# Patient Record
Sex: FEMALE | Race: WHITE | Employment: FULL TIME | URBAN - METROPOLITAN AREA
[De-identification: names, ages, dates, MRNs, and addresses within clinical notes are randomized per-mention and may not be internally consistent; named-entity substitution may affect disease eponyms.]

---

## 2020-06-05 PROBLEM — N39.0 UTI (URINARY TRACT INFECTION): Status: ACTIVE | Noted: 2020-06-05

## 2020-06-06 ENCOUNTER — APPOINTMENT (OUTPATIENT)
Dept: GENERAL RADIOLOGY | Age: 40
End: 2020-06-06
Attending: UROLOGY
Payer: COMMERCIAL

## 2020-06-06 ENCOUNTER — HOSPITAL ENCOUNTER (OUTPATIENT)
Age: 40
Setting detail: OBSERVATION
Discharge: HOME | End: 2020-06-06
Attending: UROLOGY | Admitting: UROLOGY
Payer: COMMERCIAL

## 2020-06-06 ENCOUNTER — ANESTHESIA EVENT (OUTPATIENT)
Dept: OPERATING ROOM | Age: 40
End: 2020-06-06
Payer: COMMERCIAL

## 2020-06-06 ENCOUNTER — ANESTHESIA (OUTPATIENT)
Dept: OPERATING ROOM | Age: 40
End: 2020-06-06
Payer: COMMERCIAL

## 2020-06-06 VITALS
SYSTOLIC BLOOD PRESSURE: 104 MMHG | HEIGHT: 66 IN | BODY MASS INDEX: 23.3 KG/M2 | WEIGHT: 145 LBS | OXYGEN SATURATION: 100 % | DIASTOLIC BLOOD PRESSURE: 65 MMHG | TEMPERATURE: 97.9 F | RESPIRATION RATE: 16 BRPM | HEART RATE: 71 BPM

## 2020-06-06 VITALS
DIASTOLIC BLOOD PRESSURE: 58 MMHG | SYSTOLIC BLOOD PRESSURE: 93 MMHG | OXYGEN SATURATION: 100 % | RESPIRATION RATE: 13 BRPM

## 2020-06-06 LAB
ANION GAP SERPL CALCULATED.3IONS-SCNC: 10 MMOL/L (ref 9–17)
BUN BLDV-MCNC: 9 MG/DL (ref 6–20)
BUN/CREAT BLD: ABNORMAL (ref 9–20)
CALCIUM SERPL-MCNC: 8 MG/DL (ref 8.6–10.4)
CHLORIDE BLD-SCNC: 104 MMOL/L (ref 98–107)
CO2: 21 MMOL/L (ref 20–31)
CREAT SERPL-MCNC: 0.56 MG/DL (ref 0.5–0.9)
CULTURE: NO GROWTH
GFR AFRICAN AMERICAN: >60 ML/MIN
GFR NON-AFRICAN AMERICAN: >60 ML/MIN
GFR SERPL CREATININE-BSD FRML MDRD: ABNORMAL ML/MIN/{1.73_M2}
GFR SERPL CREATININE-BSD FRML MDRD: ABNORMAL ML/MIN/{1.73_M2}
GLUCOSE BLD-MCNC: 103 MG/DL (ref 70–99)
HCG QUALITATIVE: NEGATIVE
HCT VFR BLD CALC: 35 % (ref 36.3–47.1)
HEMOGLOBIN: 11 G/DL (ref 11.9–15.1)
Lab: NORMAL
MCH RBC QN AUTO: 30.6 PG (ref 25.2–33.5)
MCHC RBC AUTO-ENTMCNC: 31.4 G/DL (ref 28.4–34.8)
MCV RBC AUTO: 97.5 FL (ref 82.6–102.9)
NRBC AUTOMATED: 0 PER 100 WBC
PDW BLD-RTO: 13.3 % (ref 11.8–14.4)
PLATELET # BLD: 267 K/UL (ref 138–453)
PMV BLD AUTO: 10.8 FL (ref 8.1–13.5)
POTASSIUM SERPL-SCNC: 4.1 MMOL/L (ref 3.7–5.3)
RBC # BLD: 3.59 M/UL (ref 3.95–5.11)
SARS-COV-2, PCR: NORMAL
SARS-COV-2, RAPID: NOT DETECTED
SARS-COV-2: NORMAL
SODIUM BLD-SCNC: 135 MMOL/L (ref 135–144)
SOURCE: NORMAL
SPECIMEN DESCRIPTION: NORMAL
WBC # BLD: 9.9 K/UL (ref 3.5–11.3)

## 2020-06-06 PROCEDURE — 6370000000 HC RX 637 (ALT 250 FOR IP): Performed by: STUDENT IN AN ORGANIZED HEALTH CARE EDUCATION/TRAINING PROGRAM

## 2020-06-06 PROCEDURE — 3600000002 HC SURGERY LEVEL 2 BASE: Performed by: UROLOGY

## 2020-06-06 PROCEDURE — 2580000003 HC RX 258: Performed by: STUDENT IN AN ORGANIZED HEALTH CARE EDUCATION/TRAINING PROGRAM

## 2020-06-06 PROCEDURE — 81025 URINE PREGNANCY TEST: CPT

## 2020-06-06 PROCEDURE — 80048 BASIC METABOLIC PNL TOTAL CA: CPT

## 2020-06-06 PROCEDURE — 3600000012 HC SURGERY LEVEL 2 ADDTL 15MIN: Performed by: UROLOGY

## 2020-06-06 PROCEDURE — 3700000001 HC ADD 15 MINUTES (ANESTHESIA): Performed by: UROLOGY

## 2020-06-06 PROCEDURE — 2500000003 HC RX 250 WO HCPCS: Performed by: NURSE ANESTHETIST, CERTIFIED REGISTERED

## 2020-06-06 PROCEDURE — 36415 COLL VENOUS BLD VENIPUNCTURE: CPT

## 2020-06-06 PROCEDURE — 6360000004 HC RX CONTRAST MEDICATION: Performed by: UROLOGY

## 2020-06-06 PROCEDURE — U0002 COVID-19 LAB TEST NON-CDC: HCPCS

## 2020-06-06 PROCEDURE — 87086 URINE CULTURE/COLONY COUNT: CPT

## 2020-06-06 PROCEDURE — G0379 DIRECT REFER HOSPITAL OBSERV: HCPCS

## 2020-06-06 PROCEDURE — G0378 HOSPITAL OBSERVATION PER HR: HCPCS

## 2020-06-06 PROCEDURE — 6360000002 HC RX W HCPCS: Performed by: STUDENT IN AN ORGANIZED HEALTH CARE EDUCATION/TRAINING PROGRAM

## 2020-06-06 PROCEDURE — C1769 GUIDE WIRE: HCPCS | Performed by: UROLOGY

## 2020-06-06 PROCEDURE — 7100000010 HC PHASE II RECOVERY - FIRST 15 MIN: Performed by: UROLOGY

## 2020-06-06 PROCEDURE — C2617 STENT, NON-COR, TEM W/O DEL: HCPCS | Performed by: UROLOGY

## 2020-06-06 PROCEDURE — 74018 RADEX ABDOMEN 1 VIEW: CPT

## 2020-06-06 PROCEDURE — 7100000011 HC PHASE II RECOVERY - ADDTL 15 MIN: Performed by: UROLOGY

## 2020-06-06 PROCEDURE — 3700000000 HC ANESTHESIA ATTENDED CARE: Performed by: UROLOGY

## 2020-06-06 PROCEDURE — 2580000003 HC RX 258: Performed by: UROLOGY

## 2020-06-06 PROCEDURE — 2709999900 HC NON-CHARGEABLE SUPPLY: Performed by: UROLOGY

## 2020-06-06 PROCEDURE — 6360000002 HC RX W HCPCS: Performed by: NURSE ANESTHETIST, CERTIFIED REGISTERED

## 2020-06-06 PROCEDURE — 85027 COMPLETE CBC AUTOMATED: CPT

## 2020-06-06 PROCEDURE — 84703 CHORIONIC GONADOTROPIN ASSAY: CPT

## 2020-06-06 PROCEDURE — 2580000003 HC RX 258: Performed by: NURSE ANESTHETIST, CERTIFIED REGISTERED

## 2020-06-06 DEVICE — UNIVERSA SOFT URETERAL STENT AND POSITIONER WITH HYDROPHILIC COATING AND MONOFILAMENT TETHER
Type: IMPLANTABLE DEVICE | Site: URETER | Status: FUNCTIONAL
Brand: UNIVERSA

## 2020-06-06 RX ORDER — MAGNESIUM HYDROXIDE 1200 MG/15ML
LIQUID ORAL CONTINUOUS PRN
Status: COMPLETED | OUTPATIENT
Start: 2020-06-06 | End: 2020-06-06

## 2020-06-06 RX ORDER — SODIUM CHLORIDE 9 MG/ML
INJECTION, SOLUTION INTRAVENOUS CONTINUOUS
Status: DISCONTINUED | OUTPATIENT
Start: 2020-06-06 | End: 2020-06-06

## 2020-06-06 RX ORDER — SODIUM CHLORIDE 0.9 % (FLUSH) 0.9 %
10 SYRINGE (ML) INJECTION EVERY 12 HOURS SCHEDULED
Status: DISCONTINUED | OUTPATIENT
Start: 2020-06-06 | End: 2020-06-06 | Stop reason: HOSPADM

## 2020-06-06 RX ORDER — OXYCODONE HYDROCHLORIDE AND ACETAMINOPHEN 5; 325 MG/1; MG/1
2 TABLET ORAL EVERY 4 HOURS PRN
Status: DISCONTINUED | OUTPATIENT
Start: 2020-06-06 | End: 2020-06-06 | Stop reason: HOSPADM

## 2020-06-06 RX ORDER — HYDROCODONE BITARTRATE AND ACETAMINOPHEN 5; 325 MG/1; MG/1
1 TABLET ORAL PRN
Status: DISCONTINUED | OUTPATIENT
Start: 2020-06-06 | End: 2020-06-06 | Stop reason: HOSPADM

## 2020-06-06 RX ORDER — HYDROCODONE BITARTRATE AND ACETAMINOPHEN 5; 325 MG/1; MG/1
2 TABLET ORAL EVERY 6 HOURS PRN
Status: DISCONTINUED | OUTPATIENT
Start: 2020-06-06 | End: 2020-06-06 | Stop reason: HOSPADM

## 2020-06-06 RX ORDER — OXYCODONE HYDROCHLORIDE AND ACETAMINOPHEN 5; 325 MG/1; MG/1
1 TABLET ORAL EVERY 4 HOURS PRN
Status: DISCONTINUED | OUTPATIENT
Start: 2020-06-06 | End: 2020-06-06 | Stop reason: HOSPADM

## 2020-06-06 RX ORDER — MIDAZOLAM HYDROCHLORIDE 1 MG/ML
INJECTION INTRAMUSCULAR; INTRAVENOUS PRN
Status: DISCONTINUED | OUTPATIENT
Start: 2020-06-06 | End: 2020-06-06 | Stop reason: SDUPTHER

## 2020-06-06 RX ORDER — PROPOFOL 10 MG/ML
INJECTION, EMULSION INTRAVENOUS PRN
Status: DISCONTINUED | OUTPATIENT
Start: 2020-06-06 | End: 2020-06-06 | Stop reason: SDUPTHER

## 2020-06-06 RX ORDER — FENTANYL CITRATE 50 UG/ML
50 INJECTION, SOLUTION INTRAMUSCULAR; INTRAVENOUS EVERY 5 MIN PRN
Status: DISCONTINUED | OUTPATIENT
Start: 2020-06-06 | End: 2020-06-06 | Stop reason: HOSPADM

## 2020-06-06 RX ORDER — OXYBUTYNIN CHLORIDE 5 MG/1
5 TABLET ORAL 2 TIMES DAILY PRN
Qty: 30 TABLET | Refills: 3 | Status: SHIPPED | OUTPATIENT
Start: 2020-06-06

## 2020-06-06 RX ORDER — MORPHINE SULFATE 2 MG/ML
2 INJECTION, SOLUTION INTRAMUSCULAR; INTRAVENOUS
Status: DISCONTINUED | OUTPATIENT
Start: 2020-06-06 | End: 2020-06-06

## 2020-06-06 RX ORDER — POLYETHYLENE GLYCOL 3350 17 G/17G
17 POWDER, FOR SOLUTION ORAL DAILY PRN
Status: DISCONTINUED | OUTPATIENT
Start: 2020-06-06 | End: 2020-06-06 | Stop reason: HOSPADM

## 2020-06-06 RX ORDER — FENTANYL CITRATE 50 UG/ML
25 INJECTION, SOLUTION INTRAMUSCULAR; INTRAVENOUS EVERY 5 MIN PRN
Status: DISCONTINUED | OUTPATIENT
Start: 2020-06-06 | End: 2020-06-06 | Stop reason: HOSPADM

## 2020-06-06 RX ORDER — FENTANYL CITRATE 50 UG/ML
INJECTION, SOLUTION INTRAMUSCULAR; INTRAVENOUS PRN
Status: DISCONTINUED | OUTPATIENT
Start: 2020-06-06 | End: 2020-06-06 | Stop reason: SDUPTHER

## 2020-06-06 RX ORDER — PROMETHAZINE HYDROCHLORIDE 25 MG/1
12.5 TABLET ORAL EVERY 6 HOURS PRN
Status: DISCONTINUED | OUTPATIENT
Start: 2020-06-06 | End: 2020-06-06 | Stop reason: HOSPADM

## 2020-06-06 RX ORDER — SODIUM CHLORIDE, SODIUM LACTATE, POTASSIUM CHLORIDE, CALCIUM CHLORIDE 600; 310; 30; 20 MG/100ML; MG/100ML; MG/100ML; MG/100ML
INJECTION, SOLUTION INTRAVENOUS CONTINUOUS PRN
Status: DISCONTINUED | OUTPATIENT
Start: 2020-06-06 | End: 2020-06-06 | Stop reason: SDUPTHER

## 2020-06-06 RX ORDER — TAMSULOSIN HYDROCHLORIDE 0.4 MG/1
0.4 CAPSULE ORAL DAILY
Qty: 30 CAPSULE | Refills: 0 | Status: SHIPPED | OUTPATIENT
Start: 2020-06-06

## 2020-06-06 RX ORDER — ONDANSETRON 2 MG/ML
4 INJECTION INTRAMUSCULAR; INTRAVENOUS EVERY 6 HOURS PRN
Status: DISCONTINUED | OUTPATIENT
Start: 2020-06-06 | End: 2020-06-06 | Stop reason: HOSPADM

## 2020-06-06 RX ORDER — HYDROCODONE BITARTRATE AND ACETAMINOPHEN 5; 325 MG/1; MG/1
1 TABLET ORAL EVERY 4 HOURS PRN
Qty: 10 TABLET | Refills: 0 | Status: SHIPPED | OUTPATIENT
Start: 2020-06-06 | End: 2020-06-09

## 2020-06-06 RX ORDER — ACETAMINOPHEN 325 MG/1
650 TABLET ORAL EVERY 4 HOURS PRN
Status: DISCONTINUED | OUTPATIENT
Start: 2020-06-06 | End: 2020-06-06 | Stop reason: HOSPADM

## 2020-06-06 RX ORDER — LEVOFLOXACIN 500 MG/1
500 TABLET, FILM COATED ORAL DAILY
Qty: 14 TABLET | Refills: 0 | Status: SHIPPED | OUTPATIENT
Start: 2020-06-06 | End: 2020-06-20

## 2020-06-06 RX ORDER — LIDOCAINE HYDROCHLORIDE 10 MG/ML
INJECTION, SOLUTION EPIDURAL; INFILTRATION; INTRACAUDAL; PERINEURAL PRN
Status: DISCONTINUED | OUTPATIENT
Start: 2020-06-06 | End: 2020-06-06 | Stop reason: SDUPTHER

## 2020-06-06 RX ORDER — SODIUM CHLORIDE 0.9 % (FLUSH) 0.9 %
10 SYRINGE (ML) INJECTION PRN
Status: DISCONTINUED | OUTPATIENT
Start: 2020-06-06 | End: 2020-06-06 | Stop reason: HOSPADM

## 2020-06-06 RX ORDER — HYDROCODONE BITARTRATE AND ACETAMINOPHEN 5; 325 MG/1; MG/1
2 TABLET ORAL PRN
Status: DISCONTINUED | OUTPATIENT
Start: 2020-06-06 | End: 2020-06-06 | Stop reason: HOSPADM

## 2020-06-06 RX ORDER — HYDROCODONE BITARTRATE AND ACETAMINOPHEN 5; 325 MG/1; MG/1
1 TABLET ORAL EVERY 6 HOURS PRN
Status: DISCONTINUED | OUTPATIENT
Start: 2020-06-06 | End: 2020-06-06 | Stop reason: HOSPADM

## 2020-06-06 RX ORDER — OMEPRAZOLE 10 MG/1
10 CAPSULE, DELAYED RELEASE ORAL DAILY
COMMUNITY

## 2020-06-06 RX ORDER — TAMSULOSIN HYDROCHLORIDE 0.4 MG/1
0.4 CAPSULE ORAL DAILY
Status: DISCONTINUED | OUTPATIENT
Start: 2020-06-06 | End: 2020-06-06 | Stop reason: HOSPADM

## 2020-06-06 RX ADMIN — OXYCODONE HYDROCHLORIDE AND ACETAMINOPHEN 1 TABLET: 5; 325 TABLET ORAL at 08:37

## 2020-06-06 RX ADMIN — FENTANYL CITRATE 50 MCG: 50 INJECTION INTRAMUSCULAR; INTRAVENOUS at 14:30

## 2020-06-06 RX ADMIN — PROMETHAZINE HYDROCHLORIDE 12.5 MG: 25 TABLET ORAL at 03:01

## 2020-06-06 RX ADMIN — PROPOFOL 50 MG: 10 INJECTION, EMULSION INTRAVENOUS at 14:48

## 2020-06-06 RX ADMIN — CEFTRIAXONE SODIUM 1 G: 1 INJECTION, POWDER, FOR SOLUTION INTRAMUSCULAR; INTRAVENOUS at 03:10

## 2020-06-06 RX ADMIN — PROPOFOL 50 MG: 10 INJECTION, EMULSION INTRAVENOUS at 14:59

## 2020-06-06 RX ADMIN — PROPOFOL 50 MG: 10 INJECTION, EMULSION INTRAVENOUS at 14:38

## 2020-06-06 RX ADMIN — OXYCODONE HYDROCHLORIDE AND ACETAMINOPHEN 1 TABLET: 5; 325 TABLET ORAL at 16:09

## 2020-06-06 RX ADMIN — FENTANYL CITRATE 25 MCG: 50 INJECTION INTRAMUSCULAR; INTRAVENOUS at 14:38

## 2020-06-06 RX ADMIN — PROMETHAZINE HYDROCHLORIDE 12.5 MG: 25 TABLET ORAL at 08:37

## 2020-06-06 RX ADMIN — MIDAZOLAM HYDROCHLORIDE 2 MG: 1 INJECTION, SOLUTION INTRAMUSCULAR; INTRAVENOUS at 14:29

## 2020-06-06 RX ADMIN — FENTANYL CITRATE 25 MCG: 50 INJECTION INTRAMUSCULAR; INTRAVENOUS at 14:34

## 2020-06-06 RX ADMIN — LIDOCAINE HYDROCHLORIDE 5 ML: 10 INJECTION, SOLUTION EPIDURAL; INFILTRATION; INTRACAUDAL; PERINEURAL at 14:38

## 2020-06-06 RX ADMIN — SODIUM CHLORIDE: 9 INJECTION, SOLUTION INTRAVENOUS at 03:05

## 2020-06-06 RX ADMIN — PROPOFOL 50 MG: 10 INJECTION, EMULSION INTRAVENOUS at 14:42

## 2020-06-06 RX ADMIN — OXYCODONE HYDROCHLORIDE AND ACETAMINOPHEN 1 TABLET: 5; 325 TABLET ORAL at 03:02

## 2020-06-06 RX ADMIN — SODIUM CHLORIDE, POTASSIUM CHLORIDE, SODIUM LACTATE AND CALCIUM CHLORIDE: 600; 310; 30; 20 INJECTION, SOLUTION INTRAVENOUS at 14:28

## 2020-06-06 RX ADMIN — TAMSULOSIN HYDROCHLORIDE 0.4 MG: 0.4 CAPSULE ORAL at 02:33

## 2020-06-06 RX ADMIN — SODIUM CHLORIDE: 9 INJECTION, SOLUTION INTRAVENOUS at 15:30

## 2020-06-06 ASSESSMENT — PULMONARY FUNCTION TESTS
PIF_VALUE: 0
PIF_VALUE: 2
PIF_VALUE: 0
PIF_VALUE: 2
PIF_VALUE: 0
PIF_VALUE: 1
PIF_VALUE: 0
PIF_VALUE: 1
PIF_VALUE: 0

## 2020-06-06 ASSESSMENT — PAIN SCALES - GENERAL
PAINLEVEL_OUTOF10: 0
PAINLEVEL_OUTOF10: 6
PAINLEVEL_OUTOF10: 3
PAINLEVEL_OUTOF10: 5
PAINLEVEL_OUTOF10: 5
PAINLEVEL_OUTOF10: 0
PAINLEVEL_OUTOF10: 5

## 2020-06-06 ASSESSMENT — PAIN DESCRIPTION - ORIENTATION: ORIENTATION: RIGHT

## 2020-06-06 ASSESSMENT — PAIN DESCRIPTION - LOCATION: LOCATION: FLANK

## 2020-06-06 ASSESSMENT — PAIN DESCRIPTION - FREQUENCY: FREQUENCY: CONTINUOUS

## 2020-06-06 ASSESSMENT — PAIN DESCRIPTION - DESCRIPTORS: DESCRIPTORS: SHARP

## 2020-06-06 ASSESSMENT — PAIN - FUNCTIONAL ASSESSMENT: PAIN_FUNCTIONAL_ASSESSMENT: 0-10

## 2020-06-06 ASSESSMENT — PAIN DESCRIPTION - PAIN TYPE: TYPE: ACUTE PAIN

## 2020-06-06 NOTE — H&P
Active Problem List   Diagnosis    UTI (urinary tract infection)       Plan:   N.p.o.  IV fluids at 150 mL/h  Pain control with Percocet  and morphine as needed  Injection Rocephin 1 g IV every 24 hours  Flomax 0.4 mg once daily  Strain all urine  Consented for surgery  Plan for cystoscopy and right ureteral stent placement tomorrow  Treatment plan discussed with Dr. Olimpia Arceo       Thank you for involving us in the care of Paulina Mcleod. Should you have any questions, please do not hesitate to contact us at any time.     Annamaria Saha  1:27 AM 6/6/2020

## 2020-06-06 NOTE — ANESTHESIA PRE PROCEDURE
2852       Allergies:  No Known Allergies    Problem List:    Patient Active Problem List   Diagnosis Code    UTI (urinary tract infection) N39.0       Past Medical History:        Diagnosis Date    History of  2009    History of cholecystectomy     History of dilatation and curettage     History of endometrial ablation     History of endometrial ablation 2012    Miscarriage 2006       Past Surgical History:  No past surgical history on file. Social History:    Social History     Tobacco Use    Smoking status: Never Smoker    Smokeless tobacco: Never Used   Substance Use Topics    Alcohol use: Not on file                                Counseling given: Not Answered      Vital Signs (Current):   Vitals:    20 0100 20 0209 20 0823   BP: 106/67  105/66   Pulse: 74  66   Resp: 17  18   Temp: 98 °F (36.7 °C)  97.7 °F (36.5 °C)   TempSrc: Oral  Oral   SpO2: 96%  94%   Weight:  145 lb (65.8 kg)    Height:  5' 6\" (1.676 m)                                               BP Readings from Last 3 Encounters:   20 105/66       NPO Status:                                                                                 BMI:   Wt Readings from Last 3 Encounters:   20 145 lb (65.8 kg)     Body mass index is 23.4 kg/m². CBC:   Lab Results   Component Value Date    WBC 9.9 2020    RBC 3.59 2020    HGB 11.0 2020    HCT 35.0 2020    MCV 97.5 2020    RDW 13.3 2020     2020       CMP:   Lab Results   Component Value Date     2020    K 4.1 2020     2020    CO2 21 2020    BUN 9 2020    CREATININE 0.56 2020    GFRAA >60 2020    LABGLOM >60 2020    GLUCOSE 103 2020    CALCIUM 8.0 2020       POC Tests: No results for input(s): POCGLU, POCNA, POCK, POCCL, POCBUN, POCHEMO, POCHCT in the last 72 hours.     Coags: No results found for: PROTIME, INR,

## 2020-06-06 NOTE — OP NOTE
FACILITY: [address] [place] [address]  1980  [id number]    DATE:  06/06/20  SURGEON:  FEDE Garcia Light:  Dr. Leticia Chavis M.D. PREOPERATIVE DIAGNOSIS: Right obstructing 7 mm distal ureteral calculus, UTI/sepsis  POSTOPERATIVE DIAGNOSIS: Right obstructing 7 mm distal ureteral calculus, UTI/sepsis  PROCEDURES PERFORMED:  1. Cystoscopy. 2. Right sided stent placement. DRAINS: A right sided 5x26 cm double-J ureteral stent, 16 Chadian two-way Vazquez catheter with 10 cc sterile water in balloon  SPECIMEN: Post-stent urine for culture   ANESTHESIA: MAC  ESTIMATED BLOOD LOSS: Minimal, <5 cc  COMPLICATIONS:  None     INDICATIONS FOR PROCEDURE:  Raphael Tony is a 47 y.o. female with aforementioned presentation. She was offered stent emergently for right-sided collecting system decompression. After the risks, benefits, alternatives, of the procedure were discussed with the patient, informed consent was obtained. The patient elected to proceed.     DETAILS OF THE PROCEDURE:  The patient was brought back to the preoperative holding area to the operating suite, and was transferred to the operating table where the patient lay in supine position. MAC was induced, and patient was prepped and draped in standard surgical fashion after being placed in dorsolithotomy position. A proper timeout was performed, preoperative antibiotics were given. A rigid cystoscope with a 22 Chadian sheath with 30 degree lens was inserted in the patient's urethra and into the bladder with ease. We then focused our attention on the right ureteral orifice, which we cannulated with our glidewire. Ureteral orifice was noted to be edematous with some blood at the entrance as well as a small stone fragment in the bladder. Over our glidewire we placed a 5x26 cm double-J ureteral stent and we noted appropriate placement in the upper collecting system using fluoroscopy.   We then removed our
curl confirmed on fluoroscopy and good distal curl under direct visualization. The attached string was removed. The cystoscope was removed after bladder was drained and the procedure was subsequently terminated. Patient tolerated procedure without complication. The patient was taken to recovery in good condition.     Dr. Mela Parekh was present and scrubbed for all critical portions of the procedure. PLAN:  Patient will be monitored postoperatively and if she does not have any deterioration in clinical status she will be discharged with oral antibiotics with plans for definitive stone treatment in 1 to 2 weeks as an outpatient.

## 2020-06-06 NOTE — DISCHARGE SUMMARY
cystoscopy with right stent placement and was discharged a couple hours postoperatively after being found to have stable vitals and no clinical concern of worsening of infection. By discharge, she was ambulating well, tolerating regular diet, and had good pain control on oral medication. She was discharged with scheduled follow-up on course of oral antibiotics which will be tailored to urine culture results and will undergo definitive stone treatment in the future. Bernard Jason MD  PGY-3, [facility] Department of Urology

## 2020-06-08 LAB — HCG, PREGNANCY URINE (POC): NEGATIVE

## 2020-07-05 PROBLEM — N39.0 UTI (URINARY TRACT INFECTION): Status: RESOLVED | Noted: 2020-06-05 | Resolved: 2020-07-05

## (undated) DEVICE — GLOVE ORANGE PI 7 1/2   MSG9075

## (undated) DEVICE — GLOVE ORANGE PI 7   MSG9070

## (undated) DEVICE — PACK PROCEDURE SURG CYSTO SVMMC LF

## (undated) DEVICE — DRAPE,REIN 53X77,STERILE: Brand: MEDLINE

## (undated) DEVICE — GLOVE ORTHO 7 1/2   MSG9475

## (undated) DEVICE — 3M™ STERI-STRIP™ COMPOUND BENZOIN TINCTURE 40 BAGS/CARTON 4 CARTONS/CASE C1544: Brand: 3M™ STERI-STRIP™

## (undated) DEVICE — GUIDEWIRE URO L150CM DIA0.035IN STIFF NIT HYDRPHLC STR TIP

## (undated) DEVICE — STRIP,CLOSURE,WOUND,MEDI-STRIP,1/2X4: Brand: MEDLINE